# Patient Record
Sex: FEMALE | Race: BLACK OR AFRICAN AMERICAN
[De-identification: names, ages, dates, MRNs, and addresses within clinical notes are randomized per-mention and may not be internally consistent; named-entity substitution may affect disease eponyms.]

---

## 2017-04-25 ENCOUNTER — HOSPITAL ENCOUNTER (EMERGENCY)
Dept: HOSPITAL 26 - MED | Age: 82
Discharge: HOME | End: 2017-04-25
Payer: COMMERCIAL

## 2017-04-25 VITALS — DIASTOLIC BLOOD PRESSURE: 59 MMHG | SYSTOLIC BLOOD PRESSURE: 129 MMHG

## 2017-04-25 VITALS — DIASTOLIC BLOOD PRESSURE: 70 MMHG | SYSTOLIC BLOOD PRESSURE: 136 MMHG

## 2017-04-25 VITALS — HEIGHT: 60 IN | BODY MASS INDEX: 22.44 KG/M2 | WEIGHT: 114.31 LBS

## 2017-04-25 DIAGNOSIS — I10: ICD-10-CM

## 2017-04-25 DIAGNOSIS — R11.10: Primary | ICD-10-CM

## 2017-04-25 DIAGNOSIS — F03.90: ICD-10-CM

## 2017-04-25 DIAGNOSIS — R94.31: ICD-10-CM

## 2017-04-25 DIAGNOSIS — K21.9: ICD-10-CM

## 2017-04-25 PROCEDURE — 96361 HYDRATE IV INFUSION ADD-ON: CPT

## 2017-04-25 PROCEDURE — 96374 THER/PROPH/DIAG INJ IV PUSH: CPT

## 2017-04-25 PROCEDURE — 36415 COLL VENOUS BLD VENIPUNCTURE: CPT

## 2017-04-25 PROCEDURE — 71010: CPT

## 2017-04-25 PROCEDURE — 74176 CT ABD & PELVIS W/O CONTRAST: CPT

## 2017-04-25 PROCEDURE — 85610 PROTHROMBIN TIME: CPT

## 2017-04-25 PROCEDURE — 80053 COMPREHEN METABOLIC PANEL: CPT

## 2017-04-25 PROCEDURE — 83880 ASSAY OF NATRIURETIC PEPTIDE: CPT

## 2017-04-25 PROCEDURE — 81001 URINALYSIS AUTO W/SCOPE: CPT

## 2017-04-25 PROCEDURE — 93005 ELECTROCARDIOGRAM TRACING: CPT

## 2017-04-25 PROCEDURE — 83605 ASSAY OF LACTIC ACID: CPT

## 2017-04-25 PROCEDURE — 87040 BLOOD CULTURE FOR BACTERIA: CPT

## 2017-04-25 PROCEDURE — 87086 URINE CULTURE/COLONY COUNT: CPT

## 2017-04-25 PROCEDURE — 85025 COMPLETE CBC W/AUTO DIFF WBC: CPT

## 2017-04-25 PROCEDURE — 99285 EMERGENCY DEPT VISIT HI MDM: CPT

## 2017-04-25 PROCEDURE — 85730 THROMBOPLASTIN TIME PARTIAL: CPT

## 2017-04-25 PROCEDURE — 83690 ASSAY OF LIPASE: CPT

## 2017-04-25 PROCEDURE — 84484 ASSAY OF TROPONIN QUANT: CPT

## 2017-04-25 NOTE — NUR
-------------------------------------------------------------------------------

          *** Note undone in [facility] - 04/25/17 at 0729 by [id number] ***           

-------------------------------------------------------------------------------

PT O2 % ON ROOM AIR; STATES BREATHING FINE; REFUSES SUPPLEMENTAL O2 AT 
THIS TIME; PT APPEARS TO BE RESTING COMFORTABLY IN BED; VSS; WILL CONTINUE TO 
MONITOR.

## 2017-04-25 NOTE — NUR
MONITOR TECH CALLED ANGIE ARIAS TO INFORM THEM PT READY TO BE DISCHARGED 
PER ER MD DR. GOLDBERG; FACILITY STATED WILL CALL BACK WITH ETA; PT VSS AT THIS 
TIME; NO ACUTE DISTRESS NOTED; WILL CONTINUE TO MONITOR.

## 2017-04-25 NOTE — NUR
PT O2 % ON ROOM AIR; STATES BREATHING FINE; REFUSES SUPPLEMENTAL O2 AT 
THIS TIME; PT APPEARS TO BE RESTING COMFORTABLY IN BED; VSS; WILL CONTINUE TO 
MONITOR.

## 2017-04-25 NOTE — NUR
IV removed, catheter intact and site benign.  Applied folded 4x4 gauze and tape 
to stop bleeding. PT TOLERATED PROCEDURE WELL.

## 2017-04-25 NOTE — NUR
Patient discharged with v/s stable. Written and verbal after care instructions 
given and explained. 

Patient alert, oriented and verbalized understanding of instructions. Wheel 
Chair Assisted with to car. All questions addressed prior to discharge. ID band 
removed. Patient advised to follow up with PMD. Rx of ZOFRAN ODT 4MG  given. 
Patient educated on indication of medication including possible reaction and 
side effects. Opportunity to ask questions provided and answered.

## 2017-04-25 NOTE — NUR
-------------------------------------------------------------------------------

            *** Note bairon in EDM - 04/25/17 at 0849 by MEDCELIA ***             

-------------------------------------------------------------------------------

Patient appears to be resting comfortably in bed. Vital Signs within normal 
limits. Respirations even and unlabored. PT DENIES ANY PAIN; NO ACUTE DISTRESS 
NOTED AT THIS TIME. WILL CONTINUE TO MONITOR.

## 2017-04-25 NOTE — NUR
PT O2 % ON ROOM AIR; STATES BREATHING FINE; REFUSES SUPPLEMENTAL O2 AT 
THIS TIME; PT APPEARS TO BE RESTING COMFORTABLY IN BED; VSS; PT DENIES PAIN OR 
DISCOMFORT AT THIS TIME; NO ACUTE DISTRESS NOTED AT THIS TIME; WILL CONTINUE TO 
MONITOR.

## 2017-04-25 NOTE — NUR
BIBA BLS TO ER BED 3

-------------------------------------------------------------------------------

Addendum: 04/25/17 at 0628 by MEDDM

-------------------------------------------------------------------------------

BIBA BLS TO ER BED 4

## 2017-04-25 NOTE — NUR
GAUGE 18 IV LINE ESTABLISHED TO THE RIGHT HAND. BLOOD DRAWN. ZOFRAN 4 MG IVP 
AND NS 1 LITER @ 100 ML/HR GIVEN.

## 2017-05-10 ENCOUNTER — HOSPITAL ENCOUNTER (INPATIENT)
Dept: HOSPITAL 26 - MED | Age: 82
LOS: 2 days | Discharge: NURSING, REHAB OR LONG-TERM CARE | DRG: 56 | End: 2017-05-12
Attending: FAMILY MEDICINE | Admitting: FAMILY MEDICINE
Payer: COMMERCIAL

## 2017-05-10 VITALS — DIASTOLIC BLOOD PRESSURE: 69 MMHG | SYSTOLIC BLOOD PRESSURE: 158 MMHG

## 2017-05-10 VITALS — DIASTOLIC BLOOD PRESSURE: 79 MMHG | SYSTOLIC BLOOD PRESSURE: 154 MMHG

## 2017-05-10 VITALS — SYSTOLIC BLOOD PRESSURE: 120 MMHG | DIASTOLIC BLOOD PRESSURE: 70 MMHG

## 2017-05-10 VITALS — HEIGHT: 64 IN | BODY MASS INDEX: 17.75 KG/M2 | WEIGHT: 104 LBS

## 2017-05-10 VITALS — SYSTOLIC BLOOD PRESSURE: 137 MMHG | DIASTOLIC BLOOD PRESSURE: 64 MMHG

## 2017-05-10 VITALS — DIASTOLIC BLOOD PRESSURE: 76 MMHG | SYSTOLIC BLOOD PRESSURE: 164 MMHG

## 2017-05-10 DIAGNOSIS — Z74.01: ICD-10-CM

## 2017-05-10 DIAGNOSIS — F41.1: ICD-10-CM

## 2017-05-10 DIAGNOSIS — Z79.899: ICD-10-CM

## 2017-05-10 DIAGNOSIS — D63.8: ICD-10-CM

## 2017-05-10 DIAGNOSIS — E78.5: ICD-10-CM

## 2017-05-10 DIAGNOSIS — G93.41: ICD-10-CM

## 2017-05-10 DIAGNOSIS — E87.1: ICD-10-CM

## 2017-05-10 DIAGNOSIS — G30.9: Primary | ICD-10-CM

## 2017-05-10 DIAGNOSIS — M62.50: ICD-10-CM

## 2017-05-10 DIAGNOSIS — E78.00: ICD-10-CM

## 2017-05-10 DIAGNOSIS — N39.0: ICD-10-CM

## 2017-05-10 DIAGNOSIS — G90.9: ICD-10-CM

## 2017-05-10 DIAGNOSIS — E87.6: ICD-10-CM

## 2017-05-10 DIAGNOSIS — I10: ICD-10-CM

## 2017-05-10 DIAGNOSIS — F02.81: ICD-10-CM

## 2017-05-10 DIAGNOSIS — I65.22: ICD-10-CM

## 2017-05-10 DIAGNOSIS — K21.9: ICD-10-CM

## 2017-05-10 DIAGNOSIS — E44.0: ICD-10-CM

## 2017-05-10 LAB
ALBUMIN FLD-MCNC: 3.2 G/DL (ref 3.4–5)
ALP SERPL-CCNC: 41 U/L (ref 46–116)
ALT SERPL-CCNC: 12 U/L (ref 12–78)
AMYLASE SERPL-CCNC: 69 U/L (ref 25–115)
ANION GAP SERPL CALCULATED.3IONS-SCNC: 8.9 MMOL/L (ref 8–16)
APPEARANCE UR: CLEAR
AST SERPL-CCNC: 17 U/L (ref 15–37)
BACTERIA URNS QL MICRO: (no result) /HPF
BASOPHILS # BLD AUTO: 0.1 K/UL (ref 0–0.22)
BASOPHILS NFR BLD AUTO: 1.4 % (ref 0–2)
BILIRUB SERPL-MCNC: 0.7 MG/DL (ref 0–1)
BILIRUB UR QL STRIP: NEGATIVE
BUN SERPL-MCNC: 10 MG/DL (ref 7–18)
CALCIUM SPEC-MCNC: 8.4 MG/DL (ref 8.5–10.1)
CHLORIDE SERPL-SCNC: 100 MMOL/L (ref 98–107)
CHOLEST SERPL-MCNC: 173 MG/DL (ref ?–200)
CHOLEST/HDLC SERPL: 2.4 {RATIO} (ref 1–4.5)
CO2 SERPL-SCNC: 28.9 MMOL/L (ref 21–32)
COLOR UR: YELLOW
CREAT SERPL-MCNC: 0.6 MG/DL (ref 0.6–1.3)
EOSINOPHIL # BLD AUTO: 0.5 K/UL (ref 0–0.4)
EOSINOPHIL NFR BLD AUTO: 12.6 % (ref 0–4)
ERYTHROCYTE [DISTWIDTH] IN BLOOD BY AUTOMATED COUNT: 17.1 % (ref 11.6–13.7)
GFR SERPL CREATININE-BSD FRML MDRD: (no result) ML/MIN (ref 90–?)
GLUCOSE SERPL-MCNC: 87 MG/DL (ref 74–106)
GLUCOSE UR STRIP-MCNC: NEGATIVE MG/DL
HCT VFR BLD AUTO: 28.6 % (ref 36–48)
HDLC SERPL-MCNC: 71 MG/DL (ref 40–60)
HGB BLD-MCNC: 9.2 G/DL (ref 12–16)
HGB UR QL STRIP: NEGATIVE
INR PPP: 1.1 (ref 0.8–1.2)
IRON SERPL-MCNC: 68 UG/DL (ref 35–150)
LACTATE PLASV-SCNC: 0.9 MMOL/L (ref 0.4–2)
LDLC SERPL CALC-MCNC: 92 MG/DL (ref 60–100)
LEUKOCYTE ESTERASE UR QL STRIP: (no result)
LIPASE SERPL-CCNC: 88 U/L (ref 73–393)
LIPASE SERPL-CCNC: 93 U/L (ref 73–393)
LYMPHOCYTES # BLD AUTO: 1.4 K/UL (ref 2.5–16.5)
LYMPHOCYTES NFR BLD AUTO: 35.6 % (ref 20.5–51.1)
MAGNESIUM SERPL-MCNC: 2.1 MG/DL (ref 1.8–2.4)
MCH RBC QN AUTO: 32 PG (ref 27–31)
MCHC RBC AUTO-ENTMCNC: 32 G/DL (ref 33–37)
MCV RBC AUTO: 101 FL (ref 80–94)
MONOCYTES # BLD AUTO: 0.3 K/UL (ref 0.8–1)
MONOCYTES NFR BLD AUTO: 8.2 % (ref 1.7–9.3)
NEUTROPHILS # BLD AUTO: 1.5 K/UL (ref 1.8–7.7)
NEUTROPHILS NFR BLD AUTO: 42.2 % (ref 42.2–75.2)
NITRITE UR QL STRIP: NEGATIVE
PH UR STRIP: 7 [PH] (ref 5–9)
PHOSPHATE SERPL-MCNC: 3.1 MG/DL (ref 2.5–4.9)
PLATELET # BLD AUTO: 277 K/UL (ref 140–450)
POTASSIUM SERPL-SCNC: 3.8 MMOL/L (ref 3.5–5.1)
PROT SERPL-MCNC: 5.9 G/DL (ref 6.4–8.2)
PROT UR QL STRIP: NEGATIVE
PROTHROMBIN TIME: 10.4 SECS (ref 10.8–13.4)
RBC # BLD AUTO: 2.83 MIL/UL (ref 4.2–5.4)
RBC #/AREA URNS HPF: (no result) /HPF (ref 0–5)
SODIUM SERPL-SCNC: 134 MMOL/L (ref 136–145)
SP GR UR STRIP: <=1.005 (ref 1–1.03)
SQUAMOUS URNS QL MICRO: (no result) /LPF
T4 FREE SERPL-MCNC: 1.17 NG/DL (ref 0.76–1.46)
TIBC SERPL-MCNC: 333 UG/DL (ref 250–450)
TRIGL SERPL-MCNC: 53 MG/DL (ref 30–150)
TSH SERPL DL<=0.05 MIU/L-ACNC: 1.86 UIU/ML (ref 0.34–3.76)
UROBILINOGEN UR STRIP-MCNC: 0.2 EU/DL (ref 0.2–1)
WBC # BLD AUTO: 3.8 K/UL (ref 4.8–10.8)
WBC,URINE: (no result) /HPF (ref 0–5)

## 2017-05-10 PROCEDURE — C9113 INJ PANTOPRAZOLE SODIUM, VIA: HCPCS

## 2017-05-10 PROCEDURE — C1758 CATHETER, URETERAL: HCPCS

## 2017-05-10 RX ADMIN — TEMAZEPAM SCH MG: 15 CAPSULE ORAL at 21:15

## 2017-05-10 RX ADMIN — DOCUSATE SODIUM SCH MG: 100 CAPSULE, LIQUID FILLED ORAL at 21:15

## 2017-05-10 RX ADMIN — SIMVASTATIN SCH MG: 10 TABLET, FILM COATED ORAL at 21:16

## 2017-05-10 RX ADMIN — LATANOPROST SCH ML: 50 SOLUTION OPHTHALMIC at 21:16

## 2017-05-10 RX ADMIN — PANTOPRAZOLE SODIUM SCH MG: 40 INJECTION, POWDER, FOR SOLUTION INTRAVENOUS at 13:02

## 2017-05-10 RX ADMIN — ANORECTAL OINTMENT SCH GM: 15.7; .44; 24; 20.6 OINTMENT TOPICAL at 13:05

## 2017-05-10 RX ADMIN — SODIUM CHLORIDE SCH MLS/HR: 9 INJECTION, SOLUTION INTRAVENOUS at 13:09

## 2017-05-10 RX ADMIN — DOCUSATE SODIUM SCH MG: 100 CAPSULE, LIQUID FILLED ORAL at 13:09

## 2017-05-10 NOTE — NUR
Patient will be admitted to care of DR. HARRIS. Admited to TELEMETRY .  Will go 
to room 110B. Belongings list completed.  Report to JULIA ARAGON.

## 2017-05-10 NOTE — NUR
RECEIVED REPORT FROM RN MARGO/CHARLA AT BEDSIDE. INITIAL ASSESSMENT COMPLETED. PT A0X2; 
CONFUSED AT TIMES. PT HAS IV TO LEFT HAND G 22; INFUSING FLUIDS WELL. PT IS BEDBOUND. PT'S 
LOWER BILATERAL LEGS ARE CONTRACTED. PT'S SKIN IS INTACT. PT IS INCONTINENT. PT HAS 02 2L 
NC. ORIENTED PT TO ROOM AND SURROUNDINGS AND USE OF CALL LIGHT; REINFORCEMENT NEEDED. 
SAFETY/FALL RISK PRECAUTIONS IN PLACE. BED ALARM ON. WILL CONTINUE TO MONITOR PT.

## 2017-05-10 NOTE — NUR
REPORT GIVEN TO MARGO DUMONT; PT TAKEN TO CT VIA TYRESE ACCOMPANIED BY RAD TECH AT 
THIS TIME; CT TO CALL RN TO TAKE PT FROM CT TO FLOOR; MONITOR IN PLACE ON PT'S 
BED TO CT.

## 2017-05-10 NOTE — NUR
Admitted from ED, with chief complaint of GENERALIZED WEAKNESS. PATIENT ALERT AWAKE ORIENTED 
X 2, NO SOB, DENIES ANY PAIN AT THIS TIME. IV ACCESS LEFT HAND PATENT AND INTACT. PATIENT IS 
85 y/o ,Female, Cooperative, CONTRACTED LOWER EXTREMITIES, SKIN INTACT, SCAB NOTED ON RIGHT 
BUNION AREA. oriented to call light, bed, phone,television, bathroom, smoking 
policy,visiting hours, procedures, ID bracelet on. Belongings list checked.

## 2017-05-10 NOTE — NUR
SMALL HEALING SCAB NOTED TO RT TOE; NO BLEEDING OR C/O PAIN OR DISTRESS NOTED 
AT THIS TIME; INFORMED RN MARGO; VSS STABLE; NO ACUTE DISTRESS NOTED; WILL 
CONTINUE TO MONITOR.

## 2017-05-10 NOTE — NUR
PT IS 84/F BIBA TO ED WITH C/O GEN WEAKNESS FROM [facility]. [facility] STATES 
PT HX OF HIGH CHOLESTEROL AND HTN. DENIES N/V/D; SKIN IS PINK/WARM/DRY; LUNGS 
CLEAR BL; HR EVEN AND REGULAR; PT DENIES ANY FEVER, CP, SOB, OR COUGH AT THIS 
TIME; PATIENT STATES PAIN OF 0/10 AT THIS TIME; VSS; PATIENT POSITIONED FOR 
COMFORT; HOB ELEVATED; BEDRAILS UP X2; BED DOWN. ER MD MADE AWARE OF PT STATUS.

## 2017-05-10 NOTE — NUR
SBAR REPORT GIVEN TO JULIA CROWDER. PATIENT IN A STABLE CONDITION, LYING COMFORTABLY IN BED, 
DENIES ANY PAIN AT THIS TIME.

## 2017-05-11 VITALS — DIASTOLIC BLOOD PRESSURE: 76 MMHG | SYSTOLIC BLOOD PRESSURE: 142 MMHG

## 2017-05-11 VITALS — SYSTOLIC BLOOD PRESSURE: 139 MMHG | DIASTOLIC BLOOD PRESSURE: 72 MMHG

## 2017-05-11 VITALS — DIASTOLIC BLOOD PRESSURE: 90 MMHG | SYSTOLIC BLOOD PRESSURE: 129 MMHG

## 2017-05-11 VITALS — DIASTOLIC BLOOD PRESSURE: 67 MMHG | SYSTOLIC BLOOD PRESSURE: 141 MMHG

## 2017-05-11 VITALS — DIASTOLIC BLOOD PRESSURE: 68 MMHG | SYSTOLIC BLOOD PRESSURE: 112 MMHG

## 2017-05-11 VITALS — SYSTOLIC BLOOD PRESSURE: 143 MMHG | DIASTOLIC BLOOD PRESSURE: 85 MMHG

## 2017-05-11 LAB
ANION GAP SERPL CALCULATED.3IONS-SCNC: 11.8 MMOL/L (ref 8–16)
BASOPHILS # BLD AUTO: 0.1 K/UL (ref 0–0.22)
BASOPHILS NFR BLD AUTO: 2.8 % (ref 0–2)
BUN SERPL-MCNC: 6 MG/DL (ref 7–18)
CALCIUM SPEC-MCNC: 9 MG/DL (ref 8.5–10.1)
CHLORIDE SERPL-SCNC: 101 MMOL/L (ref 98–107)
CO2 SERPL-SCNC: 27.6 MMOL/L (ref 21–32)
CREAT SERPL-MCNC: 0.5 MG/DL (ref 0.6–1.3)
EOSINOPHIL # BLD AUTO: 0.5 K/UL (ref 0–0.4)
EOSINOPHIL NFR BLD AUTO: 13.6 % (ref 0–4)
ERYTHROCYTE [DISTWIDTH] IN BLOOD BY AUTOMATED COUNT: 16.9 % (ref 11.6–13.7)
FERRITIN SERPL-MCNC: 23 NG/ML (ref 15–150)
FOLATE SERPL-MCNC: > 20 NG/ML (ref 3–?)
GFR SERPL CREATININE-BSD FRML MDRD: (no result) ML/MIN (ref 90–?)
GLUCOSE SERPL-MCNC: 79 MG/DL (ref 74–106)
HCT VFR BLD AUTO: 33.2 % (ref 36–48)
HGB BLD-MCNC: 10.7 G/DL (ref 12–16)
LYMPHOCYTES # BLD AUTO: 1.6 K/UL (ref 2.5–16.5)
LYMPHOCYTES NFR BLD AUTO: 40.9 % (ref 20.5–51.1)
MAGNESIUM SERPL-MCNC: 2 MG/DL (ref 1.8–2.4)
MCH RBC QN AUTO: 33 PG (ref 27–31)
MCHC RBC AUTO-ENTMCNC: 32 G/DL (ref 33–37)
MCV RBC AUTO: 102 FL (ref 80–94)
MONOCYTES # BLD AUTO: 0.2 K/UL (ref 0.8–1)
MONOCYTES NFR BLD AUTO: 5.9 % (ref 1.7–9.3)
NEUTROPHILS # BLD AUTO: 1.4 K/UL (ref 1.8–7.7)
NEUTROPHILS NFR BLD AUTO: 36.8 % (ref 42.2–75.2)
PHOSPHATE SERPL-MCNC: 3.6 MG/DL (ref 2.5–4.9)
PLATELET # BLD AUTO: 238 K/UL (ref 140–450)
POTASSIUM SERPL-SCNC: 3.4 MMOL/L (ref 3.5–5.1)
RBC # BLD AUTO: 3.25 MIL/UL (ref 4.2–5.4)
SODIUM SERPL-SCNC: 137 MMOL/L (ref 136–145)
TRANSFERRIN SERPL-MCNC: 258 MG/DL (ref 200–370)
VIT B12 SERPL-MCNC: 768 PG/ML (ref 211–946)
WBC # BLD AUTO: 3.8 K/UL (ref 4.8–10.8)

## 2017-05-11 RX ADMIN — Medication SCH EACH: at 09:05

## 2017-05-11 RX ADMIN — DOCUSATE SODIUM SCH MG: 100 CAPSULE, LIQUID FILLED ORAL at 23:10

## 2017-05-11 RX ADMIN — LATANOPROST SCH ML: 50 SOLUTION OPHTHALMIC at 23:12

## 2017-05-11 RX ADMIN — TEMAZEPAM SCH MG: 15 CAPSULE ORAL at 23:10

## 2017-05-11 RX ADMIN — ANORECTAL OINTMENT SCH GM: 15.7; .44; 24; 20.6 OINTMENT TOPICAL at 09:22

## 2017-05-11 RX ADMIN — DOCUSATE SODIUM SCH MG: 100 CAPSULE, LIQUID FILLED ORAL at 09:06

## 2017-05-11 RX ADMIN — SODIUM CHLORIDE SCH MLS/HR: 9 INJECTION, SOLUTION INTRAVENOUS at 04:39

## 2017-05-11 RX ADMIN — SIMVASTATIN SCH MG: 10 TABLET, FILM COATED ORAL at 23:11

## 2017-05-11 RX ADMIN — PANTOPRAZOLE SODIUM SCH MG: 40 INJECTION, POWDER, FOR SOLUTION INTRAVENOUS at 09:16

## 2017-05-11 RX ADMIN — HYDROCHLOROTHIAZIDE SCH MG: 25 TABLET ORAL at 09:04

## 2017-05-11 RX ADMIN — CITALOPRAM SCH MG: 20 TABLET ORAL at 09:05

## 2017-05-11 RX ADMIN — MULTIVITAMIN TABLET SCH TAB: TABLET at 09:05

## 2017-05-11 NOTE — NUR
PATIENT HAS BEEN SCREENED AND CATEGORIZED AS HIGH NUTRITION RISK. PATIENT WILL BE SEEN 
WITHIN 1-2 DAYS OF ADMISSION.



5/10/17-5/11/17



LAURENCE TRIANA RD

## 2017-05-11 NOTE — NUR
RECEIVED PT FROM  MARGO RN PT AAOX2 BEDBOOUND IV ON LEFT HAND INFUSING WELL ON TELMETRY SR, 
REPOSITIONED INITIAL ASSESSMENT DONE

## 2017-05-11 NOTE — NUR
RECEIVED REPORT, ASSUMED CARE. PT AOX2, RESTING COMFORTABLY IN BED. RESPIRATION EVEN AND 
UNLABORED, NO RESPIRATORY DISTRESS. PT DENIES PAIN AT THIS TIME. EDUCATED RE: Q2H TURNING. 
PT VERBALIZED UNDERSTANDING. ALL NEEDS ANTICIPATED. KEPT CLEAN AND DRY. CALL LIGHT WITHIN 
EASY REACH. WILL CONTINUE TO MONITOR.

## 2017-05-11 NOTE — NUR
PT AAOX2. NO RESPIRATORY DISTRESS, NO C/O  PAIN AT THIS TIME. IN STABLE CONDITION. ALL NEEDS 
ANTICIPATED AND MET. ENDORSED TO NEXT SHIFT FOR CONTINUITY OF CARE.

## 2017-05-11 NOTE — NUR
5/11/17 

RD INITIAL ASSESSMENT COMPLETED



PLEASE REFER TO NUTRITION ASSESSMENT UNDER CARE ACTIVITY FOR ESTIMATED NUTRITIONAL NEEDS. 



1. CONTINUE CARDIAC, MECHANICAL SOFT DIET

2. RD TO FOLLOW-UP 2-3 DAYS; HIGH RISK



LAURENCE TRIANA RD

## 2017-05-11 NOTE — NUR
IV PUMP ALARMING; FLUSHED AND INFUSING FLUIDS WELL. PT REPOSITIONED/TURNED. PT TOLERATED IT 
WELL. WILL CONTINUE TO MONITOR PT.

## 2017-05-12 VITALS — SYSTOLIC BLOOD PRESSURE: 140 MMHG | DIASTOLIC BLOOD PRESSURE: 72 MMHG

## 2017-05-12 VITALS — DIASTOLIC BLOOD PRESSURE: 63 MMHG | SYSTOLIC BLOOD PRESSURE: 116 MMHG

## 2017-05-12 VITALS — SYSTOLIC BLOOD PRESSURE: 114 MMHG | DIASTOLIC BLOOD PRESSURE: 56 MMHG

## 2017-05-12 VITALS — DIASTOLIC BLOOD PRESSURE: 67 MMHG | SYSTOLIC BLOOD PRESSURE: 136 MMHG

## 2017-05-12 LAB
ANION GAP SERPL CALCULATED.3IONS-SCNC: 11 MMOL/L (ref 8–16)
BASOPHILS # BLD AUTO: 0.1 K/UL (ref 0–0.22)
BASOPHILS NFR BLD AUTO: 3.6 % (ref 0–2)
BUN SERPL-MCNC: 7 MG/DL (ref 7–18)
CALCIUM SPEC-MCNC: 9.1 MG/DL (ref 8.5–10.1)
CHLORIDE SERPL-SCNC: 99 MMOL/L (ref 98–107)
CO2 SERPL-SCNC: 26.9 MMOL/L (ref 21–32)
CREAT SERPL-MCNC: 0.6 MG/DL (ref 0.6–1.3)
EOSINOPHIL # BLD AUTO: 0.5 K/UL (ref 0–0.4)
EOSINOPHIL NFR BLD AUTO: 14 % (ref 0–4)
ERYTHROCYTE [DISTWIDTH] IN BLOOD BY AUTOMATED COUNT: 16.9 % (ref 11.6–13.7)
GFR SERPL CREATININE-BSD FRML MDRD: (no result) ML/MIN (ref 90–?)
GLUCOSE SERPL-MCNC: 78 MG/DL (ref 74–106)
HCT VFR BLD AUTO: 31.2 % (ref 36–48)
HGB BLD-MCNC: 9.8 G/DL (ref 12–16)
LYMPHOCYTES # BLD AUTO: 1.4 K/UL (ref 2.5–16.5)
LYMPHOCYTES NFR BLD AUTO: 36.5 % (ref 20.5–51.1)
MAGNESIUM SERPL-MCNC: 1.7 MG/DL (ref 1.8–2.4)
MCH RBC QN AUTO: 32 PG (ref 27–31)
MCHC RBC AUTO-ENTMCNC: 31 G/DL (ref 33–37)
MCV RBC AUTO: 101 FL (ref 80–94)
MONOCYTES # BLD AUTO: 0.3 K/UL (ref 0.8–1)
MONOCYTES NFR BLD AUTO: 8.5 % (ref 1.7–9.3)
NEUTROPHILS # BLD AUTO: 1.3 K/UL (ref 1.8–7.7)
NEUTROPHILS NFR BLD AUTO: 37.4 % (ref 42.2–75.2)
PHOSPHATE SERPL-MCNC: 3.3 MG/DL (ref 2.5–4.9)
PLATELET # BLD AUTO: 224 K/UL (ref 140–450)
POTASSIUM SERPL-SCNC: 3.9 MMOL/L (ref 3.5–5.1)
RBC # BLD AUTO: 3.09 MIL/UL (ref 4.2–5.4)
SODIUM SERPL-SCNC: 133 MMOL/L (ref 136–145)
WBC # BLD AUTO: 3.6 K/UL (ref 4.8–10.8)

## 2017-05-12 RX ADMIN — ANORECTAL OINTMENT SCH GM: 15.7; .44; 24; 20.6 OINTMENT TOPICAL at 08:19

## 2017-05-12 RX ADMIN — CITALOPRAM SCH MG: 20 TABLET ORAL at 08:13

## 2017-05-12 RX ADMIN — PANTOPRAZOLE SODIUM SCH MG: 40 INJECTION, POWDER, FOR SOLUTION INTRAVENOUS at 08:18

## 2017-05-12 RX ADMIN — DOCUSATE SODIUM SCH MG: 100 CAPSULE, LIQUID FILLED ORAL at 08:13

## 2017-05-12 RX ADMIN — SODIUM CHLORIDE SCH MLS/HR: 9 INJECTION, SOLUTION INTRAVENOUS at 00:39

## 2017-05-12 RX ADMIN — MULTIVITAMIN TABLET SCH TAB: TABLET at 08:14

## 2017-05-12 RX ADMIN — HYDROCHLOROTHIAZIDE SCH MG: 25 TABLET ORAL at 08:14

## 2017-05-12 RX ADMIN — Medication SCH EACH: at 08:13

## 2017-05-12 NOTE — NUR
PT RESTING IN BED, NO S/S OF RESPIRATORY DISTRESS OR DISCOMFORT NOTED, CALL LIGHT IS WITHIN 
REACH, WILL CONTINUE TO MONITOR.

## 2017-05-12 NOTE — NUR
DISCHARGE PAPERWORK GIVEN TO TRANSPORT, PT UNABLE TO SIGN, ID WRIST REMOVED, IV REMOVED, 
CATHETER TIP INTACT, PREMIER HERE TO PICKUP PT.

## 2017-05-12 NOTE — NUR
CALLED [facility] -[phone], GAVE REPORT TO JULIA CUNNINGHAM FROM [facility].

## 2017-05-12 NOTE — NUR
RECEIVED REPORT FROM JULIA TORRES. PT IS A/OX2, BEDBOUND, SKIN IS INTACT, IV ON THE LEFT HAND, 
PATENT AND INTACT, NO S/S OF RESPIRATORY DISTRESS OR DISCOMFORT NOTED, SAFETY/FALL 
PRECAUTIONS ARE IN PLACE, DISCUSSED PLAN OF CARE WITH PT, PT VERBALIZED UNDERSTANDING, CALL 
LIGHT IS WITHIN REACH, WILL CONTINUE TO MONITOR.

## 2017-09-24 ENCOUNTER — HOSPITAL ENCOUNTER (EMERGENCY)
Dept: HOSPITAL 26 - MED | Age: 82
Discharge: NURSING, REHAB OR LONG-TERM CARE | End: 2017-09-24
Payer: COMMERCIAL

## 2017-09-24 VITALS — BODY MASS INDEX: 23.16 KG/M2 | WEIGHT: 118 LBS | HEIGHT: 60 IN

## 2017-09-24 VITALS — DIASTOLIC BLOOD PRESSURE: 68 MMHG | SYSTOLIC BLOOD PRESSURE: 90 MMHG

## 2017-09-24 VITALS — DIASTOLIC BLOOD PRESSURE: 52 MMHG | SYSTOLIC BLOOD PRESSURE: 111 MMHG

## 2017-09-24 DIAGNOSIS — F03.90: ICD-10-CM

## 2017-09-24 DIAGNOSIS — S01.312A: Primary | ICD-10-CM
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
## 2017-09-24 DIAGNOSIS — K21.9: ICD-10-CM
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
## 2017-09-24 DIAGNOSIS — I10: ICD-10-CM
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
## 2017-09-24 PROCEDURE — 96372 THER/PROPH/DIAG INJ SC/IM: CPT

## 2017-09-24 PROCEDURE — 99283 EMERGENCY DEPT VISIT LOW MDM: CPT

## 2017-09-24 PROCEDURE — 12011 RPR F/E/E/N/L/M 2.5 CM/<: CPT

## 2019-10-30 ENCOUNTER — HOSPITAL ENCOUNTER (EMERGENCY)
Dept: HOSPITAL 26 - MED | Age: 84
LOS: 1 days | Discharge: HOME | End: 2019-10-31
Payer: COMMERCIAL

## 2019-10-30 VITALS — SYSTOLIC BLOOD PRESSURE: 81 MMHG | DIASTOLIC BLOOD PRESSURE: 46 MMHG

## 2019-10-30 VITALS — WEIGHT: 77 LBS | BODY MASS INDEX: 14.54 KG/M2 | HEIGHT: 61 IN

## 2019-10-30 DIAGNOSIS — F03.90: ICD-10-CM

## 2019-10-30 DIAGNOSIS — E83.42: ICD-10-CM

## 2019-10-30 DIAGNOSIS — Z79.899: ICD-10-CM

## 2019-10-30 DIAGNOSIS — I10: ICD-10-CM

## 2019-10-30 DIAGNOSIS — K21.9: ICD-10-CM

## 2019-10-30 DIAGNOSIS — E88.09: ICD-10-CM

## 2019-10-30 DIAGNOSIS — E86.0: ICD-10-CM

## 2019-10-30 DIAGNOSIS — I95.9: Primary | ICD-10-CM

## 2019-10-30 LAB
ALBUMIN FLD-MCNC: 3.3 G/DL (ref 3.4–5)
ANION GAP SERPL CALCULATED.3IONS-SCNC: 10.1 MMOL/L (ref 8–16)
APPEARANCE UR: CLEAR
AST SERPL-CCNC: 18 U/L (ref 15–37)
BASOPHILS # BLD AUTO: 0 K/UL (ref 0–0.22)
BASOPHILS NFR BLD AUTO: 0.7 % (ref 0–2)
BILIRUB SERPL-MCNC: 0.5 MG/DL (ref 0–1)
BILIRUB UR QL STRIP: NEGATIVE
BUN SERPL-MCNC: 15 MG/DL (ref 7–18)
CHLORIDE SERPL-SCNC: 101 MMOL/L (ref 98–107)
CO2 SERPL-SCNC: 29.4 MMOL/L (ref 21–32)
COLOR UR: YELLOW
CREAT SERPL-MCNC: 0.5 MG/DL (ref 0.6–1.3)
EOSINOPHIL # BLD AUTO: 0.1 K/UL (ref 0–0.4)
EOSINOPHIL NFR BLD AUTO: 3.6 % (ref 0–4)
ERYTHROCYTE [DISTWIDTH] IN BLOOD BY AUTOMATED COUNT: 13.7 % (ref 11.6–13.7)
GFR SERPL CREATININE-BSD FRML MDRD: (no result) ML/MIN (ref 90–?)
GLUCOSE SERPL-MCNC: 83 MG/DL (ref 74–106)
GLUCOSE UR STRIP-MCNC: NEGATIVE MG/DL
HCT VFR BLD AUTO: 30.6 % (ref 36–48)
HGB BLD-MCNC: 10.2 G/DL (ref 12–16)
HGB UR QL STRIP: NEGATIVE
LEUKOCYTE ESTERASE UR QL STRIP: NEGATIVE
LYMPHOCYTES # BLD AUTO: 1 K/UL (ref 2.5–16.5)
LYMPHOCYTES NFR BLD AUTO: 40.8 % (ref 20.5–51.1)
MCH RBC QN AUTO: 38 PG (ref 27–31)
MCHC RBC AUTO-ENTMCNC: 33 G/DL (ref 33–37)
MCV RBC AUTO: 113.6 FL (ref 80–94)
MONOCYTES # BLD AUTO: 0.1 K/UL (ref 0.8–1)
MONOCYTES NFR BLD AUTO: 5.8 % (ref 1.7–9.3)
NEUTROPHILS # BLD AUTO: 1.1 K/UL (ref 1.8–7.7)
NEUTROPHILS NFR BLD AUTO: 49.1 % (ref 42.2–75.2)
NITRITE UR QL STRIP: NEGATIVE
PH UR STRIP: 6 [PH] (ref 5–9)
PLATELET # BLD AUTO: 210 K/UL (ref 140–450)
POTASSIUM SERPL-SCNC: 3.5 MMOL/L (ref 3.5–5.1)
PROTHROMBIN TIME: 11.6 SECS (ref 10.8–13.4)
RBC # BLD AUTO: 2.7 MIL/UL (ref 4.2–5.4)
SODIUM SERPL-SCNC: 137 MMOL/L (ref 136–145)
WBC # BLD AUTO: 2.3 K/UL (ref 4.8–10.8)

## 2019-10-30 PROCEDURE — 87040 BLOOD CULTURE FOR BACTERIA: CPT

## 2019-10-30 PROCEDURE — 85025 COMPLETE CBC W/AUTO DIFF WBC: CPT

## 2019-10-30 PROCEDURE — 81003 URINALYSIS AUTO W/O SCOPE: CPT

## 2019-10-30 PROCEDURE — 87086 URINE CULTURE/COLONY COUNT: CPT

## 2019-10-30 PROCEDURE — 71045 X-RAY EXAM CHEST 1 VIEW: CPT

## 2019-10-30 PROCEDURE — 93005 ELECTROCARDIOGRAM TRACING: CPT

## 2019-10-30 PROCEDURE — 99284 EMERGENCY DEPT VISIT MOD MDM: CPT

## 2019-10-30 PROCEDURE — 96360 HYDRATION IV INFUSION INIT: CPT

## 2019-10-30 PROCEDURE — 83605 ASSAY OF LACTIC ACID: CPT

## 2019-10-30 PROCEDURE — 84484 ASSAY OF TROPONIN QUANT: CPT

## 2019-10-30 PROCEDURE — 80053 COMPREHEN METABOLIC PANEL: CPT

## 2019-10-30 PROCEDURE — 36415 COLL VENOUS BLD VENIPUNCTURE: CPT

## 2019-10-30 PROCEDURE — 85610 PROTHROMBIN TIME: CPT

## 2019-10-30 PROCEDURE — 83880 ASSAY OF NATRIURETIC PEPTIDE: CPT

## 2019-10-30 PROCEDURE — 85730 THROMBOPLASTIN TIME PARTIAL: CPT

## 2019-10-30 NOTE — NUR
PT BIBA FROM [facility] FOR C/O HYPOTENSION. PT HAS CLEAR LUNG SOUNDS, 
SKIN IS DRY, LOOSE AND HAS BURSING AROUND HER ARMS. HAS A PRESSURE ULCER ON 
SACRUM LOCATION. PT IS VERBAL ONLY TO STIMULATE. VSS. DAUGHTER IN LAW AT 
BEDSIDE. HEART SOUND IS REGULAR. 



NKA.

## 2019-10-31 VITALS — DIASTOLIC BLOOD PRESSURE: 54 MMHG | SYSTOLIC BLOOD PRESSURE: 114 MMHG

## 2019-10-31 NOTE — NUR
Patient discharged with v/s stable. Pt encouraged to drink plenty of fluid. 
Waiting for Ambulance Transport to nursing home. All questions addressed prior 
to discharge. ID band removed. Patient advised to follow up with PMD. 
Opportunity to ask questions provided and answered.